# Patient Record
Sex: FEMALE | Race: WHITE | Employment: PART TIME | URBAN - METROPOLITAN AREA
[De-identification: names, ages, dates, MRNs, and addresses within clinical notes are randomized per-mention and may not be internally consistent; named-entity substitution may affect disease eponyms.]

---

## 2017-05-16 ENCOUNTER — APPOINTMENT (OUTPATIENT)
Dept: OCCUPATIONAL MEDICINE | Age: 22
End: 2017-05-16
Attending: EMERGENCY MEDICINE

## 2017-12-27 ENCOUNTER — HOSPITAL ENCOUNTER (OUTPATIENT)
Age: 22
Discharge: HOME | End: 2017-12-27
Attending: FAMILY MEDICINE
Payer: COMMERCIAL

## 2017-12-27 VITALS
BODY MASS INDEX: 23.3 KG/M2 | HEIGHT: 66 IN | WEIGHT: 145 LBS | RESPIRATION RATE: 18 BRPM | DIASTOLIC BLOOD PRESSURE: 85 MMHG | OXYGEN SATURATION: 100 % | HEART RATE: 75 BPM | TEMPERATURE: 98 F | SYSTOLIC BLOOD PRESSURE: 141 MMHG

## 2017-12-27 DIAGNOSIS — B34.9 VIRAL SYNDROME: Primary | ICD-10-CM

## 2017-12-27 PROCEDURE — 87430 STREP A AG IA: CPT

## 2017-12-27 PROCEDURE — 99203 OFFICE O/P NEW LOW 30 MIN: CPT

## 2017-12-27 PROCEDURE — 99212 OFFICE O/P EST SF 10 MIN: CPT

## 2017-12-27 NOTE — ED PROVIDER NOTES
Patient presents with:  Sore Throat      HPI:     Leopoldo Cano is a 25year old female who presents with for chief complaint of sore throat, abd. cramping   X 2 days.     The patient denies complaints of fever, headache, neck pain, ear pain, difficu auscultation bilaterally. No chest wall retractions. No respiratory distress.  No tachypnea noted  CARDIOVASCULAR:   Heart: S1, S2 normal, no murmur, click, rub or gallop, regular rate and rhythm  GI -  Abdomen: soft, non-tender; bowel sounds normal; no mas

## 2018-05-29 ENCOUNTER — TELEPHONE (OUTPATIENT)
Dept: OPHTHALMOLOGY | Facility: CLINIC | Age: 23
End: 2018-05-29

## 2018-05-29 NOTE — TELEPHONE ENCOUNTER
Cedrick GOMEZ x 2 months. Pt has been using warm compresses. Pt was seen by a doctor away at school. Apt booked June 22; no sooner openings. I put pt on the wait list and ask Dr. Michael Ferguson if pt can be seen sooner. Sent to Dr. Michael Ferguson.

## 2018-05-29 NOTE — TELEPHONE ENCOUNTER
Patient calling to see if she can see Berry Pool. States she has a stye in her right eye for about 2 months now. States seen other ophthalmologist for treatment and has not helped. Would like to see Berry Pool sooner than next available. Please advise. Thank you.